# Patient Record
Sex: FEMALE | Race: WHITE
[De-identification: names, ages, dates, MRNs, and addresses within clinical notes are randomized per-mention and may not be internally consistent; named-entity substitution may affect disease eponyms.]

---

## 2017-05-09 ENCOUNTER — HOSPITAL ENCOUNTER (OUTPATIENT)
Dept: HOSPITAL 19 - MC.RAD | Age: 42
End: 2017-05-09
Attending: OBSTETRICS & GYNECOLOGY
Payer: COMMERCIAL

## 2017-05-09 DIAGNOSIS — Z12.31: Primary | ICD-10-CM

## 2018-05-22 ENCOUNTER — HOSPITAL ENCOUNTER (OUTPATIENT)
Dept: HOSPITAL 19 - MC.RAD | Age: 43
End: 2018-05-22
Attending: OBSTETRICS & GYNECOLOGY
Payer: COMMERCIAL

## 2018-05-22 DIAGNOSIS — Z12.31: Primary | ICD-10-CM

## 2019-06-27 ENCOUNTER — HOSPITAL ENCOUNTER (OUTPATIENT)
Dept: HOSPITAL 19 - MC.RAD | Age: 44
End: 2019-06-27
Attending: OBSTETRICS & GYNECOLOGY
Payer: COMMERCIAL

## 2019-06-27 DIAGNOSIS — Z12.31: Primary | ICD-10-CM

## 2020-07-07 ENCOUNTER — HOSPITAL ENCOUNTER (OUTPATIENT)
Dept: HOSPITAL 19 - MC.RAD | Age: 45
End: 2020-07-07
Attending: OBSTETRICS & GYNECOLOGY
Payer: COMMERCIAL

## 2020-07-07 DIAGNOSIS — Z12.31: Primary | ICD-10-CM

## 2021-01-15 ENCOUNTER — HOSPITAL ENCOUNTER (OUTPATIENT)
Dept: HOSPITAL 19 - SDCO | Age: 46
Discharge: HOME | End: 2021-01-15
Attending: SURGERY
Payer: COMMERCIAL

## 2021-01-15 VITALS — TEMPERATURE: 98.3 F | HEART RATE: 56 BPM | DIASTOLIC BLOOD PRESSURE: 80 MMHG | SYSTOLIC BLOOD PRESSURE: 114 MMHG

## 2021-01-15 VITALS — SYSTOLIC BLOOD PRESSURE: 108 MMHG | DIASTOLIC BLOOD PRESSURE: 85 MMHG | HEART RATE: 54 BPM

## 2021-01-15 VITALS — BODY MASS INDEX: 29.03 KG/M2 | WEIGHT: 195.99 LBS | HEIGHT: 69 IN

## 2021-01-15 VITALS — DIASTOLIC BLOOD PRESSURE: 77 MMHG | HEART RATE: 60 BPM | SYSTOLIC BLOOD PRESSURE: 112 MMHG

## 2021-01-15 VITALS — DIASTOLIC BLOOD PRESSURE: 50 MMHG | SYSTOLIC BLOOD PRESSURE: 118 MMHG | HEART RATE: 68 BPM

## 2021-01-15 DIAGNOSIS — K21.9: ICD-10-CM

## 2021-01-15 DIAGNOSIS — K62.5: ICD-10-CM

## 2021-01-15 DIAGNOSIS — K64.0: ICD-10-CM

## 2021-01-15 DIAGNOSIS — Z88.0: ICD-10-CM

## 2021-01-15 DIAGNOSIS — K44.9: Primary | ICD-10-CM

## 2021-01-15 DIAGNOSIS — K62.89: ICD-10-CM

## 2021-01-15 NOTE — NUR
The patient is sitting up in the recliner and has finished eating her food and
drink. Vital signs appear stable. Call light is within reach. Will continue to
monitor the patient.

## 2021-01-15 NOTE — NUR
1246 PATIENT TO [facility] 3 POST PROCEDURE VIA CART WITH RAHEEM NIX RN.
AMBULATES TO CHAIR WITH ASSIST.  VITAL SIGNS TAKEN.  WARM BLANKET GIVEN,
SITTING UP IN CHAIR.  NO COMPLAINTS OF PAIN, NAUSEA OR DIZZINESS. DRY THROAT.
ICE WATER AND HOT COCOA GIVEN TO DRINK.

## 2021-01-15 NOTE — NUR
Discharge instructions were reviewed with the patient at this time. She
verbalized understanding and has no questions for the nurse at this time. The
patient's IV to her right hand was removed and a pressure dressing was applied
to the site. The nurse instructed the patient to get and notify the staff when
she is ready to be escorted out.

## 2021-01-15 NOTE — NUR
The patient appears to be tolerating the food and drink well. Vital signs
appear stable. Call light is within reach. Will continue to monitor the
patient.

## 2021-01-15 NOTE — NUR
The patient was escorted out via wheelchair to a private vehicle by LISET Ding.
The patient's belongings and discharge paperwork were sent with her. The
patient's  is present to drive her home.

## 2021-09-30 ENCOUNTER — HOSPITAL ENCOUNTER (OUTPATIENT)
Dept: HOSPITAL 19 - MC.RAD | Age: 46
End: 2021-09-30
Attending: OBSTETRICS & GYNECOLOGY
Payer: COMMERCIAL

## 2021-09-30 DIAGNOSIS — Z12.31: Primary | ICD-10-CM

## 2024-10-09 ENCOUNTER — HOSPITAL ENCOUNTER (OUTPATIENT)
Dept: HOSPITAL 19 - MC.RAD | Age: 49
End: 2024-10-09
Attending: OBSTETRICS & GYNECOLOGY
Payer: COMMERCIAL

## 2024-10-09 DIAGNOSIS — Z12.31: Primary | ICD-10-CM

## 2024-10-09 DIAGNOSIS — Z98.82: ICD-10-CM
